# Patient Record
Sex: MALE | Race: WHITE | NOT HISPANIC OR LATINO | Employment: OTHER | URBAN - METROPOLITAN AREA
[De-identification: names, ages, dates, MRNs, and addresses within clinical notes are randomized per-mention and may not be internally consistent; named-entity substitution may affect disease eponyms.]

---

## 2019-04-08 ENCOUNTER — TRANSCRIBE ORDERS (OUTPATIENT)
Dept: ADMINISTRATIVE | Facility: HOSPITAL | Age: 79
End: 2019-04-08

## 2019-04-08 DIAGNOSIS — R06.89 HYPOVENTILATION, IDIOPATHIC: Primary | ICD-10-CM

## 2019-08-24 ENCOUNTER — HOSPITAL ENCOUNTER (EMERGENCY)
Facility: HOSPITAL | Age: 79
Discharge: HOME | End: 2019-08-24
Attending: EMERGENCY MEDICINE | Admitting: EMERGENCY MEDICINE
Payer: COMMERCIAL

## 2019-08-24 ENCOUNTER — APPOINTMENT (EMERGENCY)
Dept: CT IMAGING | Facility: HOSPITAL | Age: 79
End: 2019-08-24
Payer: COMMERCIAL

## 2019-08-24 VITALS
WEIGHT: 200 LBS | TEMPERATURE: 98 F | HEART RATE: 87 BPM | DIASTOLIC BLOOD PRESSURE: 76 MMHG | SYSTOLIC BLOOD PRESSURE: 127 MMHG | RESPIRATION RATE: 18 BRPM | OXYGEN SATURATION: 94 %

## 2019-08-24 DIAGNOSIS — K61.1 PERIRECTAL ABSCESS: Primary | ICD-10-CM

## 2019-08-24 LAB
ALBUMIN SERPL BCP-MCNC: 3.9 G/DL (ref 3.5–5)
ALP SERPL-CCNC: 60 U/L (ref 46–116)
ALT SERPL W P-5'-P-CCNC: 22 U/L (ref 12–78)
ANION GAP SERPL CALCULATED.3IONS-SCNC: 9 MMOL/L (ref 4–13)
APTT PPP: 33 SECONDS (ref 23–37)
AST SERPL W P-5'-P-CCNC: 21 U/L (ref 5–45)
BASOPHILS # BLD AUTO: 0.03 THOUSANDS/ΜL (ref 0–0.1)
BASOPHILS NFR BLD AUTO: 0 % (ref 0–1)
BILIRUB DIRECT SERPL-MCNC: 0.15 MG/DL (ref 0–0.2)
BILIRUB SERPL-MCNC: 0.6 MG/DL (ref 0.2–1)
BUN SERPL-MCNC: 22 MG/DL (ref 5–25)
CALCIUM SERPL-MCNC: 9.6 MG/DL (ref 8.3–10.1)
CHLORIDE SERPL-SCNC: 101 MMOL/L (ref 100–108)
CO2 SERPL-SCNC: 30 MMOL/L (ref 21–32)
CREAT SERPL-MCNC: 1.26 MG/DL (ref 0.6–1.3)
EOSINOPHIL # BLD AUTO: 0.1 THOUSAND/ΜL (ref 0–0.61)
EOSINOPHIL NFR BLD AUTO: 1 % (ref 0–6)
ERYTHROCYTE [DISTWIDTH] IN BLOOD BY AUTOMATED COUNT: 15.4 % (ref 11.6–15.1)
GFR SERPL CREATININE-BSD FRML MDRD: 53 ML/MIN/1.73SQ M
GLUCOSE SERPL-MCNC: 122 MG/DL (ref 65–140)
HCT VFR BLD AUTO: 45.5 % (ref 36.5–49.3)
HGB BLD-MCNC: 14.6 G/DL (ref 12–17)
IMM GRANULOCYTES # BLD AUTO: 0.02 THOUSAND/UL (ref 0–0.2)
IMM GRANULOCYTES NFR BLD AUTO: 0 % (ref 0–2)
INR PPP: 1.11 (ref 0.84–1.19)
LYMPHOCYTES # BLD AUTO: 1.39 THOUSANDS/ΜL (ref 0.6–4.47)
LYMPHOCYTES NFR BLD AUTO: 16 % (ref 14–44)
MCH RBC QN AUTO: 29.9 PG (ref 26.8–34.3)
MCHC RBC AUTO-ENTMCNC: 32.1 G/DL (ref 31.4–37.4)
MCV RBC AUTO: 93 FL (ref 82–98)
MONOCYTES # BLD AUTO: 0.91 THOUSAND/ΜL (ref 0.17–1.22)
MONOCYTES NFR BLD AUTO: 11 % (ref 4–12)
NEUTROPHILS # BLD AUTO: 6.12 THOUSANDS/ΜL (ref 1.85–7.62)
NEUTS SEG NFR BLD AUTO: 72 % (ref 43–75)
NRBC BLD AUTO-RTO: 0 /100 WBCS
PLATELET # BLD AUTO: 165 THOUSANDS/UL (ref 149–390)
PMV BLD AUTO: 10.5 FL (ref 8.9–12.7)
POTASSIUM SERPL-SCNC: 4.5 MMOL/L (ref 3.5–5.3)
PROT SERPL-MCNC: 7.5 G/DL (ref 6.4–8.2)
PROTHROMBIN TIME: 14.4 SECONDS (ref 11.6–14.5)
RBC # BLD AUTO: 4.89 MILLION/UL (ref 3.88–5.62)
SODIUM SERPL-SCNC: 140 MMOL/L (ref 136–145)
WBC # BLD AUTO: 8.57 THOUSAND/UL (ref 4.31–10.16)

## 2019-08-24 PROCEDURE — 85610 PROTHROMBIN TIME: CPT | Performed by: EMERGENCY MEDICINE

## 2019-08-24 PROCEDURE — 80048 BASIC METABOLIC PNL TOTAL CA: CPT | Performed by: EMERGENCY MEDICINE

## 2019-08-24 PROCEDURE — 99284 EMERGENCY DEPT VISIT MOD MDM: CPT | Performed by: EMERGENCY MEDICINE

## 2019-08-24 PROCEDURE — 80076 HEPATIC FUNCTION PANEL: CPT | Performed by: EMERGENCY MEDICINE

## 2019-08-24 PROCEDURE — 99284 EMERGENCY DEPT VISIT MOD MDM: CPT

## 2019-08-24 PROCEDURE — 85025 COMPLETE CBC W/AUTO DIFF WBC: CPT | Performed by: EMERGENCY MEDICINE

## 2019-08-24 PROCEDURE — 36415 COLL VENOUS BLD VENIPUNCTURE: CPT | Performed by: EMERGENCY MEDICINE

## 2019-08-24 PROCEDURE — 85730 THROMBOPLASTIN TIME PARTIAL: CPT | Performed by: EMERGENCY MEDICINE

## 2019-08-24 PROCEDURE — 74177 CT ABD & PELVIS W/CONTRAST: CPT

## 2019-08-24 RX ORDER — CLINDAMYCIN HYDROCHLORIDE 150 MG/1
300 CAPSULE ORAL ONCE
Status: COMPLETED | OUTPATIENT
Start: 2019-08-24 | End: 2019-08-24

## 2019-08-24 RX ORDER — CLINDAMYCIN HYDROCHLORIDE 150 MG/1
300 CAPSULE ORAL EVERY 8 HOURS SCHEDULED
Qty: 42 CAPSULE | Refills: 0 | Status: SHIPPED | OUTPATIENT
Start: 2019-08-24 | End: 2019-08-31

## 2019-08-24 RX ADMIN — IOHEXOL 100 ML: 350 INJECTION, SOLUTION INTRAVENOUS at 19:34

## 2019-08-24 RX ADMIN — CLINDAMYCIN HYDROCHLORIDE 300 MG: 150 CAPSULE ORAL at 20:32

## 2019-08-24 NOTE — ED PROVIDER NOTES
History  Chief Complaint   Patient presents with    Abscess - Complicated     Pt reports clarence-rectal abscess that burst and is draining urine and bowel  Pt reports approximately 10 days of perirectal pressure, pain and swelling  Started as a pimple but increased in size and has gotten uncomfortable to sit down  He denies h/o similar sxs in past  He denies fever, chills, weakness, nausea, vomiting, and abdominal pain  He denies a h/o crohn's or UC  He reports following w GI regularly for colonoscopies and he denies any medical problems and takes no medications  He reports that the area that hurts earlier today spontaneously drained foul smelling pus  He presents here for further evaluation  He has no other sxs or concerns at this time  None       Past Medical History:   Diagnosis Date    Disease of thyroid gland     Hyperlipidemia     Hypertension     Renal disorder        History reviewed  No pertinent surgical history  History reviewed  No pertinent family history  I have reviewed and agree with the history as documented  Social History     Tobacco Use    Smoking status: Former Smoker   Substance Use Topics    Alcohol use: Not Currently    Drug use: Never        Review of Systems   Constitutional: Negative for chills and fever  Gastrointestinal: Positive for rectal pain  Negative for abdominal distention, abdominal pain, anal bleeding, blood in stool, constipation, diarrhea, nausea and vomiting  All other systems reviewed and are negative  Physical Exam  Physical Exam   Constitutional: He is oriented to person, place, and time  He appears well-developed and well-nourished  No distress  HENT:   Head: Normocephalic and atraumatic  Eyes: Pupils are equal, round, and reactive to light  Conjunctivae and EOM are normal    Neck: Normal range of motion  Neck supple  No JVD present  Cardiovascular: Normal rate, regular rhythm, normal heart sounds and intact distal pulses   Exam reveals no gallop and no friction rub  No murmur heard  Pulmonary/Chest: Effort normal and breath sounds normal  No stridor  No respiratory distress  He has no wheezes  He has no rales  Abdominal: Soft  He exhibits no distension  There is no tenderness  Genitourinary: Rectum normal    Genitourinary Comments: There is an area of induration on the medial proximal L posterior thigh near the rectum, a trace amount of purulence is able to be expressed on palpation  There is no surrounding cellulitis  There is no crepitus or fluctuance  There is no visible drainable collection  The perineum is normal to inspection and is not erythematous or indurated  Musculoskeletal: Normal range of motion  He exhibits no edema, tenderness or deformity  Neurological: He is alert and oriented to person, place, and time  No cranial nerve deficit or sensory deficit  He exhibits normal muscle tone  Coordination normal    Skin: Skin is warm and dry  Capillary refill takes less than 2 seconds  He is not diaphoretic  Nursing note and vitals reviewed        Vital Signs  ED Triage Vitals [08/24/19 1801]   Temperature Pulse Respirations Blood Pressure SpO2   98 °F (36 7 °C) 87 18 127/76 94 %      Temp Source Heart Rate Source Patient Position - Orthostatic VS BP Location FiO2 (%)   Oral Monitor Lying Left arm --      Pain Score       8           Vitals:    08/24/19 1801   BP: 127/76   Pulse: 87   Patient Position - Orthostatic VS: Lying         Visual Acuity      ED Medications  Medications   iohexol (OMNIPAQUE) 350 MG/ML injection (MULTI-DOSE) 100 mL (100 mL Intravenous Given 8/24/19 1934)   clindamycin (CLEOCIN) capsule 300 mg (300 mg Oral Given 8/24/19 2032)       Diagnostic Studies  Results Reviewed     Procedure Component Value Units Date/Time    Basic metabolic panel [[id number]] Collected:  08/24/19 1851    Lab Status:  Final result Specimen:  Blood from Arm, Right Updated:  08/24/19 1918     Sodium 140 mmol/L      Potassium 4 5 mmol/L      Chloride 101 mmol/L      CO2 30 mmol/L      ANION GAP 9 mmol/L      BUN 22 mg/dL      Creatinine 1 26 mg/dL      Glucose 122 mg/dL      Calcium 9 6 mg/dL      eGFR 53 ml/min/1 73sq m     Narrative:       [place] guidelines for Chronic Kidney Disease (CKD):     Stage 1 with normal or high GFR (GFR > 90 mL/min/1 73 square meters)    Stage 2 Mild CKD (GFR = 60-89 mL/min/1 73 square meters)    Stage 3A Moderate CKD (GFR = 45-59 mL/min/1 73 square meters)    Stage 3B Moderate CKD (GFR = 30-44 mL/min/1 73 square meters)    Stage 4 Severe CKD (GFR = 15-29 mL/min/1 73 square meters)    Stage 5 End Stage CKD (GFR <15 mL/min/1 73 square meters)  Note: GFR calculation is accurate only with a steady state creatinine    Hepatic function panel [[id number]]  (Normal) Collected:  08/24/19 1851    Lab Status:  Final result Specimen:  Blood from Arm, Right Updated:  08/24/19 1918     Total Bilirubin 0 60 mg/dL      Bilirubin, Direct 0 15 mg/dL      Alkaline Phosphatase 60 U/L      AST 21 U/L      ALT 22 U/L      Total Protein 7 5 g/dL      Albumin 3 9 g/dL     Protime-INR [[id number]]  (Normal) Collected:  08/24/19 1851    Lab Status:  Final result Specimen:  Blood from Arm, Right Updated:  08/24/19 1913     Protime 14 4 seconds      INR 1 11    APTT [[id number]]  (Normal) Collected:  08/24/19 1851    Lab Status:  Final result Specimen:  Blood from Arm, Right Updated:  08/24/19 1913     PTT 33 seconds     CBC and differential [[id number]]  (Abnormal) Collected:  08/24/19 1851    Lab Status:  Final result Specimen:  Blood from Arm, Right Updated:  08/24/19 1900     WBC 8 57 Thousand/uL      RBC 4 89 Million/uL      Hemoglobin 14 6 g/dL      Hematocrit 45 5 %      MCV 93 fL      MCH 29 9 pg      MCHC 32 1 g/dL      RDW 15 4 %      MPV 10 5 fL      Platelets 685 Thousands/uL      nRBC 0 /100 WBCs      Neutrophils Relative 72 %      Immat GRANS % 0 %      Lymphocytes Relative 16 %      Monocytes Relative 11 %      Eosinophils Relative 1 %      Basophils Relative 0 %      Neutrophils Absolute 6 12 Thousands/µL      Immature Grans Absolute 0 02 Thousand/uL      Lymphocytes Absolute 1 39 Thousands/µL      Monocytes Absolute 0 91 Thousand/µL      Eosinophils Absolute 0 10 Thousand/µL      Basophils Absolute 0 03 Thousands/µL                  CT abdomen pelvis with contrast   Final Result by Ana María Urbina, DO (08/24 2006)      1  Question mild soft tissue thickening just below the level of the anus along the deep gluteal cleft in the midline, the caudal extent of which may not be fully visualized  Etiology is unclear although could be related to provided history of prior    perianal fistula? Clinical correlation is advised  No evidence of perianal or perirectal abscess within the visualized field of view  2  1 5 x 0 7 cm cyst proximal pancreatic body  Consider follow-up MRI/MRCP in 6-12 months  3  Colonic diverticulosis and 2 mm nonobstructing left renal calculus  4  Prostatomegaly  Workstation performed: [id number]                    Procedures  Procedures       ED Course                               MDM  Number of Diagnoses or Management Options  Perirectal abscess:   Diagnosis management comments: Perirectal abscess with spontanous decompression  No drainable collection at this time  Plan - d/c home, po abx, rted if new or worsening sxs or systemic illness  F/u w GI for colonoscopy to r/o fistula          Amount and/or Complexity of Data Reviewed  Clinical lab tests: reviewed and ordered  Tests in the radiology section of CPT®: reviewed and ordered  Independent visualization of images, tracings, or specimens: yes        Disposition  Final diagnoses:   Perirectal abscess     Time reflects when diagnosis was documented in both MDM as applicable and the Disposition within this note     Time User Action Codes Description Comment    8/24/2019  8:26 PM Hermelinda Franco Add [K61 1] Perirectal abscess       ED Disposition     ED Disposition Condition Date/Time Comment    Discharge Stable Sat Aug 24, 2019  8:26 PM Shannon Quezada discharge to home/self care  Follow-up Information     Follow up With Specialties Details Why [address], DO Gastroenterology In 1 week  [address]            Discharge Medication List as of 8/24/2019  8:27 PM      START taking these medications    Details   clindamycin (CLEOCIN) 150 mg capsule Take 2 capsules (300 mg total) by mouth every 8 (eight) hours for 7 days, Starting Sat 8/24/2019, Until Sat 8/31/2019, Print           No discharge procedures on file      ED Provider  Electronically Signed by           Mike Larson MD  08/25/19 0053

## 2021-03-13 ENCOUNTER — HOSPITAL ENCOUNTER (EMERGENCY)
Facility: HOSPITAL | Age: 81
Discharge: HOME | End: 2021-03-13
Attending: EMERGENCY MEDICINE | Admitting: EMERGENCY MEDICINE
Payer: COMMERCIAL

## 2021-03-13 VITALS
TEMPERATURE: 97.7 F | BODY MASS INDEX: 26.61 KG/M2 | HEART RATE: 75 BPM | DIASTOLIC BLOOD PRESSURE: 78 MMHG | SYSTOLIC BLOOD PRESSURE: 170 MMHG | RESPIRATION RATE: 18 BRPM | WEIGHT: 196.43 LBS | HEIGHT: 72 IN | OXYGEN SATURATION: 95 %

## 2021-03-13 DIAGNOSIS — L02.215 PERINEAL ABSCESS: Primary | ICD-10-CM

## 2021-03-13 PROCEDURE — 10061 I&D ABSCESS COMP/MULTIPLE: CPT | Performed by: EMERGENCY MEDICINE

## 2021-03-13 PROCEDURE — 99282 EMERGENCY DEPT VISIT SF MDM: CPT

## 2021-03-13 PROCEDURE — 99284 EMERGENCY DEPT VISIT MOD MDM: CPT | Performed by: EMERGENCY MEDICINE

## 2021-03-13 RX ORDER — LEVOTHYROXINE SODIUM 0.15 MG/1
150 TABLET ORAL DAILY
COMMUNITY
Start: 2021-02-10

## 2021-03-13 RX ORDER — ATORVASTATIN CALCIUM 20 MG/1
20 TABLET, FILM COATED ORAL DAILY
COMMUNITY
Start: 2021-02-10

## 2021-03-13 RX ORDER — CLINDAMYCIN HYDROCHLORIDE 150 MG/1
300 CAPSULE ORAL EVERY 8 HOURS SCHEDULED
Qty: 42 CAPSULE | Refills: 0 | Status: SHIPPED | OUTPATIENT
Start: 2021-03-13 | End: 2021-03-20

## 2021-03-13 RX ORDER — LISINOPRIL 10 MG/1
10 TABLET ORAL DAILY
COMMUNITY
Start: 2021-02-10

## 2021-03-13 RX ORDER — FLUTICASONE PROPIONATE 50 MCG
2 SPRAY, SUSPENSION (ML) NASAL DAILY
COMMUNITY
Start: 2021-02-10

## 2021-03-13 RX ORDER — ATENOLOL 25 MG/1
25 TABLET ORAL DAILY
COMMUNITY
Start: 2021-02-10

## 2021-03-13 RX ORDER — FLUVASTATIN 40 MG/1
40 CAPSULE ORAL DAILY
COMMUNITY

## 2021-03-13 NOTE — ED PROVIDER NOTES
History  Chief Complaint   Patient presents with    Abscess     Patient with area of redness and swelling to left medial thigh for "a couple of weeks "  States yesterday afternoon, it "openened up "  Patient observed sqwelling returned today  Denies fevers  77 yo male with h/o prior perirectal abscess and DM II who presents to the ED for evaluation of several weeks of perirectal pain which is mild to moderate, worse with palpation, associated purulent drainage in last 24 hours  No associated systemic illness, denies fever chills vomiting weakness and abd pain  Reports he was seen here approximately 2 years ago and was advised to f/u w GI to r/o fistula, states he has had a colonoscopy since that ED visit and did not have evidence of fistula  Prior to Admission Medications   Prescriptions Last Dose Informant Patient Reported? Taking? ASPIRIN 81 PO 3/13/2021 at 0800  Yes Yes   Sig: Take 81 mg by mouth daily   atenolol (TENORMIN) 25 mg tablet 3/13/2021 at 0800  Yes Yes   Sig: Take 25 mg by mouth daily   atorvastatin (LIPITOR) 20 mg tablet 3/12/2021 at Unknown time  Yes Yes   Sig: Take 20 mg by mouth daily   fluticasone (FLONASE) 50 mcg/act nasal spray 3/12/2021 at Unknown time  Yes Yes   Si sprays into each nostril daily   fluvastatin (LESCOL) 40 MG capsule 3/13/2021 at 0800  Yes Yes   Sig: Take 40 mg by mouth daily   levothyroxine 150 mcg tablet 3/13/2021 at 0700  Yes Yes   Sig: Take 150 mcg by mouth daily   lisinopril (ZESTRIL) 10 mg tablet 3/13/2021 at 0800  Yes Yes   Sig: Take 10 mg by mouth daily      Facility-Administered Medications: None       Past Medical History:   Diagnosis Date    Disease of thyroid gland     Hyperlipidemia     Hypertension     Renal disorder        History reviewed  No pertinent surgical history  History reviewed  No pertinent family history  I have reviewed and agree with the history as documented      E-Cigarette/Vaping    E-Cigarette Use Never User E-Cigarette/Vaping Substances     Social History     Tobacco Use    Smoking status: Former Smoker    Smokeless tobacco: Never Used   Substance Use Topics    Alcohol use: Not Currently    Drug use: Never       Review of Systems   Gastrointestinal: Positive for rectal pain  Negative for abdominal distention and abdominal pain  All other systems reviewed and are negative  Physical Exam  Physical Exam  Vitals signs and nursing note reviewed  Constitutional:       General: He is not in acute distress  Appearance: He is well-developed  He is not diaphoretic  HENT:      Head: Normocephalic and atraumatic  Eyes:      General:         Right eye: No discharge  Left eye: No discharge  Conjunctiva/sclera: Conjunctivae normal       Pupils: Pupils are equal, round, and reactive to light  Neck:      Musculoskeletal: Normal range of motion and neck supple  Vascular: No JVD  Pulmonary:      Effort: Pulmonary effort is normal  No respiratory distress  Breath sounds: No stridor  Abdominal:      General: There is no distension  Tenderness: There is no abdominal tenderness  Comments: There is a 1cm area of induration of the perineum without surrounding cellulitis or crepitus or fluctuance  There is spontaneous purulent drainage from this and tenderness to palpation  The area of induration is separate from and does not appear to involve the rectum  Musculoskeletal: Normal range of motion  General: No swelling, tenderness, deformity or signs of injury  Skin:     General: Skin is warm and dry  Capillary Refill: Capillary refill takes less than 2 seconds  Coloration: Skin is not pale  Findings: No erythema or rash  Neurological:      Mental Status: He is alert and oriented to person, place, and time  Cranial Nerves: No cranial nerve deficit  Sensory: No sensory deficit  Motor: No abnormal muscle tone        Coordination: Coordination normal          Vital Signs  ED Triage Vitals [03/13/21 1529]   Temperature Pulse Respirations Blood Pressure SpO2   97 7 °F (36 5 °C) 75 18 170/78 95 %      Temp Source Heart Rate Source Patient Position - Orthostatic VS BP Location FiO2 (%)   Oral Monitor Lying Right arm --      Pain Score       5           Vitals:    03/13/21 1529   BP: 170/78   Pulse: 75   Patient Position - Orthostatic VS: Lying         Visual Acuity      ED Medications  Medications - No data to display    Diagnostic Studies  Results Reviewed     None                 No orders to display              Procedures  Incision and drain    Date/Time: 3/13/2021 4:28 PM  Performed by: Crystal Shaffer MD  Authorized by: Crystal Shaffer MD   Universal Protocol:  Consent: Verbal consent obtained  Risks and benefits: risks, benefits and alternatives were discussed  Consent given by: patient      Patient location:  ED  Location:     Type:  Abscess    Size:  1cm    Location:  Anogenital    Anogenital location:  Perineum  Procedure details:     Complexity:  Simple    Incision types:  Stab incision    Scalpel blade:  11    Wound management:  Probed and deloculated    Drainage:  Purulent    Drainage amount: Moderate    Wound treatment:  Packing placed    Packing materials:  1/4 in gauze  Post-procedure details:     Patient tolerance of procedure: Tolerated well, no immediate complications             ED Course               Identification of Seniors at Risk      Most Recent Value   (ISAR) Identification of Seniors at Risk   Before the illness or injury that brought you to the Emergency, did you need someone to help you on a regular basis? 0 Filed at: 03/13/2021 1532   In the last 24 hours, have you needed more help than usual?  0 Filed at: 03/13/2021 1532   Have you been hospitalized for one or more nights during the past 6 months?   0 Filed at: 03/13/2021 1532   In general, do you see well?  0 Filed at: 03/13/2021 1532   In general, do you have serious problems with your memory? 0 Filed at: 03/13/2021 1532   Do you take more than three different medications every day? 1 Filed at: 03/13/2021 1532   ISAR Score  1 Filed at: 03/13/2021 1532                    SBIRT 22yo+      Most Recent Value   SBIRT (25 yo +)   In order to provide better care to our patients, we are screening all of our patients for alcohol and drug use  Would it be okay to ask you these screening questions? Yes Filed at: 03/13/2021 1532   Initial Alcohol Screen: US AUDIT-C    1  How often do you have a drink containing alcohol?  0 Filed at: 03/13/2021 1532   2  How many drinks containing alcohol do you have on a typical day you are drinking? 0 Filed at: 03/13/2021 1532   3b  FEMALE Any Age, or MALE 65+: How often do you have 4 or more drinks on one occassion? 0 Filed at: 03/13/2021 1532   Audit-C Score  0 Filed at: 03/13/2021 1532   ROBER: How many times in the past year have you    Used an illegal drug or used a prescription medication for non-medical reasons? Never Filed at: 03/13/2021 1532                    MDM    Disposition  Final diagnoses:   Perineal abscess     Time reflects when diagnosis was documented in both MDM as applicable and the Disposition within this note     Time User Action Codes Description Comment    3/13/2021  4:04 PM Brian Fan Add [U48 016] Perineal abscess       ED Disposition     ED Disposition Condition Date/Time Comment    Discharge Stable Sat Mar 13, 2021  4:04 PM Rich Owens discharge to home/self care  Follow-up Information     Follow up With Specialties Details Why [address] Liliana Carter MD Colon and Rectal Surgery In 1 week to make sure that you don't have a perirectal fistula   [address]            Discharge Medication List as of 3/13/2021  4:07 PM      START taking these medications    Details   clindamycin (CLEOCIN) 150 mg capsule Take 2 capsules (300 mg total) by mouth every 8 (eight) hours for 7 days, Starting Sat 3/13/2021, Until Sat 3/20/2021, Print         CONTINUE these medications which have NOT CHANGED    Details   ASPIRIN 81 PO Take 81 mg by mouth daily, Historical Med      atenolol (TENORMIN) 25 mg tablet Take 25 mg by mouth daily, Starting Wed 2/10/2021, Historical Med      atorvastatin (LIPITOR) 20 mg tablet Take 20 mg by mouth daily, Starting Wed 2/10/2021, Historical Med      fluticasone (FLONASE) 50 mcg/act nasal spray 2 sprays into each nostril daily, Starting Wed 2/10/2021, Historical Med      fluvastatin (LESCOL) 40 MG capsule Take 40 mg by mouth daily, Historical Med      levothyroxine 150 mcg tablet Take 150 mcg by mouth daily, Starting Wed 2/10/2021, Historical Med      lisinopril (ZESTRIL) 10 mg tablet Take 10 mg by mouth daily, Starting Wed 2/10/2021, Historical Med           No discharge procedures on file      PDMP Review     None          ED Provider  Electronically Signed by           Crystal Shaffer MD  03/13/21 9023

## 2021-03-17 ENCOUNTER — HOSPITAL ENCOUNTER (EMERGENCY)
Facility: HOSPITAL | Age: 81
Discharge: HOME | End: 2021-03-17
Attending: EMERGENCY MEDICINE | Admitting: EMERGENCY MEDICINE
Payer: COMMERCIAL

## 2021-03-17 VITALS
TEMPERATURE: 97.8 F | WEIGHT: 196.21 LBS | HEART RATE: 76 BPM | SYSTOLIC BLOOD PRESSURE: 161 MMHG | RESPIRATION RATE: 18 BRPM | HEIGHT: 72 IN | DIASTOLIC BLOOD PRESSURE: 76 MMHG | BODY MASS INDEX: 26.58 KG/M2 | OXYGEN SATURATION: 93 %

## 2021-03-17 DIAGNOSIS — Z51.89 WOUND CHECK, ABSCESS: Primary | ICD-10-CM

## 2021-03-17 PROCEDURE — 99282 EMERGENCY DEPT VISIT SF MDM: CPT

## 2021-03-17 PROCEDURE — 99282 EMERGENCY DEPT VISIT SF MDM: CPT | Performed by: EMERGENCY MEDICINE

## 2021-03-17 NOTE — ED PROVIDER NOTES
Pt Name: Kraig Chavez  MRN: [id number]  [place] 1940  Age/Sex: 78 y o  male  Date of evaluation: 3/17/2021  PCP: Kim Robledo MD    [address] [place] [address]    Chief Complaint   Patient presents with    Wound Check     pt states he had an abcess drained here last week and after putting warm compresses on it he cant find the end of the packing to pull it out  HPI    78 y o  male presenting with request for wound check  Patient states he had an abscess drained 3 days ago, states the packing was placed and he was instructed to take the packing out today  Today, when he went to take packing out he was unable to find it  He states his symptoms resolved, with no pain to the area, no swelling, no fevers, and his blood sugars have been normal   He states that he has been taking his medications including the antibiotic as prescribed  Patient wants to make sure the packing has not gotten stuck inside of his body  HPI      Past Medical and Surgical History    Past Medical History:   Diagnosis Date    Disease of thyroid gland     Hyperlipidemia     Hypertension     Renal disorder        No past surgical history on file  No family history on file  Social History     Tobacco Use    Smoking status: Former Smoker    Smokeless tobacco: Never Used   Substance Use Topics    Alcohol use: Not Currently    Drug use: Never           Allergies    Allergies   Allergen Reactions    Pollen Extract Sneezing       Home Medications    Prior to Admission medications    Medication Sig Start Date End Date Taking?  Authorizing Provider   ASPIRIN 81 PO Take 81 mg by mouth daily   Yes Historical Provider, MD   atenolol (TENORMIN) 25 mg tablet Take 25 mg by mouth daily 2/10/21  Yes Historical Provider, MD   atorvastatin (LIPITOR) 20 mg tablet Take 20 mg by mouth daily 2/10/21  Yes Historical Provider, MD   clindamycin (CLEOCIN) 150 mg capsule Take 2 capsules (300 mg total) by mouth every 8 (eight) hours for 7 days 3/13/21 3/20/21 Yes Flako Nation MD   fluticasone Chavez Carlson) 50 mcg/act nasal spray 2 sprays into each nostril daily 2/10/21  Yes Historical Provider, MD   levothyroxine 150 mcg tablet Take 150 mcg by mouth daily 2/10/21  Yes Historical Provider, MD   lisinopril (ZESTRIL) 10 mg tablet Take 10 mg by mouth daily 2/10/21  Yes Historical Provider, MD   fluvastatin (LESCOL) 40 MG capsule Take 40 mg by mouth daily    Historical Provider, MD           Review of Systems    Review of Systems   Constitutional: Negative for appetite change, chills and diaphoresis  HENT: Negative for drooling, facial swelling, trouble swallowing and voice change  Respiratory: Negative for apnea, shortness of breath and wheezing  Cardiovascular: Negative for chest pain and leg swelling  Gastrointestinal: Negative for abdominal distention, abdominal pain, diarrhea, nausea and vomiting  Genitourinary: Negative for dysuria and urgency  Musculoskeletal: Negative for arthralgias, back pain, gait problem and neck pain  Skin: Positive for wound  Negative for color change and rash  Neurological: Negative for seizures, speech difficulty, weakness and headaches  Psychiatric/Behavioral: Negative for agitation, behavioral problems and dysphoric mood  The patient is not nervous/anxious  All other systems reviewed and negative  Physical Exam      ED Triage Vitals [03/17/21 1016]   Temperature Pulse Respirations Blood Pressure SpO2   97 8 °F (36 6 °C) 76 18 161/76 93 %      Temp src Heart Rate Source Patient Position - Orthostatic VS BP Location FiO2 (%)   -- Monitor Lying Right arm --      Pain Score       No Pain               Physical Exam  Vitals signs and nursing note reviewed  Constitutional:       Appearance: He is well-developed  HENT:      Head: Normocephalic and atraumatic  Eyes:      Conjunctiva/sclera: Conjunctivae normal       Pupils: Pupils are equal, round, and reactive to light     Neck: Musculoskeletal: Normal range of motion and neck supple  Trachea: No tracheal deviation  Cardiovascular:      Rate and Rhythm: Normal rate and regular rhythm  Heart sounds: Normal heart sounds  No murmur  Pulmonary:      Effort: Pulmonary effort is normal  No respiratory distress  Breath sounds: Normal breath sounds  No stridor  No wheezing or rales  Abdominal:      General: There is no distension  Palpations: Abdomen is soft  Tenderness: There is no abdominal tenderness  There is no guarding or rebound  Genitourinary:     Comments: Wound to the left inner thigh noted, consistent with surgical incision, approximately 1/2 cm in length, clean dry and intact, no purulent drainage, no surrounding erythema, no fluctuance, no palpable mass, no significant tenderness to palpation  Wound was explored with blunt forceps to base, no significant extension of wound noted, with no packing identified  Musculoskeletal: Normal range of motion  General: No deformity  Skin:     General: Skin is warm and dry  Findings: No rash  Neurological:      Mental Status: He is alert and oriented to person, place, and time  Psychiatric:         Behavior: Behavior normal          Thought Content: Thought content normal          Judgment: Judgment normal               Diagnostic Results      Labs:    Results Reviewed     None          All labs reviewed and utilized in the medical decision making process    Radiology:    No orders to display       All radiology studies independently viewed by me and interpreted by the radiologist     Procedure    Procedures        ED Course of Care and Re-Assessments      Wound explored as above in physical examination at patient's request     Medications - No data to display        FINAL IMPRESSION    Final diagnoses:   Wound check, abscess         DISPOSITION/PLAN    Wound check status post abscess incision drainage as above    Vital signs reassuring, examination overall reassuring  Retained foreign object or packing not noted at this time, suspect that it fell out  No evidence of infection at this time  Discharged strict return precautions, follow up primary care doctor  Time reflects when diagnosis was documented in both MDM as applicable and the Disposition within this note     Time User Action Codes Description Comment    3/17/2021 10:44 AM Consuelo Campos Add [Z51 [id number]] Wound check, abscess       ED Disposition     ED Disposition Condition Date/Time Comment    Discharge Stable Wed Mar 17, 2021 10:44 AM Sierra Gutierrez discharge to home/self care  Follow-up Information     Follow up With Specialties Details Why Contact Info Additional [address] [facility] Emergency Department Emergency Medicine Go to  If symptoms worsen [address] [facility] [address]  [address] [facility] Emergency Department, [address] [facility], [address], [place] Family Medicine Call in 1 day To schedule close followup as needed [address] [facility] [address] [phone]               PATIENT REFERRED TO:    [address] [facility] Emergency Department  [address] [facility] [address] [phone]  Go to   If symptoms worsen    Rodney Bell MD  [address] [facility]  [place] [address] [place] [address] [address] [phone]    Call in 1 day  To schedule close followup as needed      DISCHARGE MEDICATIONS:    Discharge Medication List as of 3/17/2021 10:44 AM      CONTINUE these medications which have NOT CHANGED    Details   ASPIRIN 81 PO Take 81 mg by mouth daily, Historical Med      atenolol (TENORMIN) 25 mg tablet Take 25 mg by mouth daily, Starting Wed 2/10/2021, Historical Med      atorvastatin (LIPITOR) 20 mg tablet Take 20 mg by mouth daily, Starting Wed 2/10/2021, Historical Med      clindamycin (CLEOCIN) 150 mg capsule Take 2 capsules (300 mg total) by mouth every 8 (eight) hours for 7 days, Starting Sat 3/13/2021, Until Sat 3/20/2021, Print      fluticasone (FLONASE) 50 mcg/act nasal spray 2 sprays into each nostril daily, Starting Wed 2/10/2021, Historical Med      levothyroxine 150 mcg tablet Take 150 mcg by mouth daily, Starting Wed 2/10/2021, Historical Med      lisinopril (ZESTRIL) 10 mg tablet Take 10 mg by mouth daily, Starting Wed 2/10/2021, Historical Med      fluvastatin (LESCOL) 40 MG capsule Take 40 mg by mouth daily, Historical Med             No discharge procedures on file           MD Nannette Hatch MD  03/17/21 8795